# Patient Record
(demographics unavailable — no encounter records)

---

## 2024-11-13 NOTE — PHYSICAL EXAM
[de-identified] : Gen: NAD Resp: Nonlabored respirations, no SOB Gait: nl   R Knee: Skin intact small effusion 0-120 AROM nontender tibial tubercle 5/5 IP Q 5/5 EHL TA GS SILT L3-S1 2+ dp pt wwp bcr  1A lachman and (-) pivot shift Grade 1 posterior drawer Stable to v/v at 0 and 30 degrees (-) Dial test at 30, 90 degrees  1+ patellar translation (-) pain with patellar compression/grind (-) J sign (-) apprehension  [de-identified] : I independently reviewed and interpreted the CT of knee 5/26 and XR 6/4.  I discussed with the patient the CT demonstrates nondisplaced tibial tubercle frx, remains nondisplaced on xr.  The following radiographs were ordered and read by me during this patient's visit. I reviewed each radiograph in detail with the patient and discussed the findings as highlighted below.   2 views of the R knee were obtained today that show nondisplaced tibial tubercle frx - healed. There are mild degenerative changes seen. There is no malalignment. No obvious osseous abnormality. Otherwise unremarkable.

## 2024-11-13 NOTE — HISTORY OF PRESENT ILLNESS
[de-identified] : 78yF pw knee pain after a fall 5/26.  She landed onto her shin and was seen in [place] [facility], dx w nondisplaced tibial tubercle frx. She was admitted and is TTWB in a rehab facility.  She has minimal pain but is anxious to leave the SNF. She has no n/p, denies other trauma.  Has been in KI.  No calf swelling/cp/sob.  7/10 interval - out of SNF, walking well, WBAT, 0/10 pain  9/4 - no assistive device, some baseline arthritic pain, very happy w function  11/13 - as her activity has increased, so has her soreness, especially w incline Completed PT Inquires about HA

## 2024-11-13 NOTE — DISCUSSION/SUMMARY
[de-identified] : 78yF pw nondisplaced tibial tubercle frx, able to SLR. Now healed The patient was extensively counseled on treatment options including but not limited to observation, rest/activity modification, bracing, anti-inflammatory medications, physical therapy, injections, and surgery.  The natural history of the disease was thoroughly explained.  We discussed that the majority of the time, this condition can be initially treated conservatively. Discussed baseline mild OA - no restrictions She would like to trial HA inj  The risks, benefits, and alternatives to an injection were reviewed with the patient.  Risks outlined include but are not limited to infection, sepsis, bleeding, scarring, temporary increase in pain, syncope, failure to resolve symptoms, symptom recurrence, allergic reaction and a flare.  The patient understood these risks and wished to proceed with an injection, providing verbal consent. Under sterile conditions using chlorhexidine and an ethyl chloride spray for topic analgesia, an injection of 3cc Euflexxa was placed in the L knee.  The patient tolerated the procedure well without complication.  The patient was advised to call if redness, pain or fever occur and to apply ice for 15 minutes every hour for the next day as tolerated.   I have personally obtained the history, reviewed the ROS as noted, and performed the physical examination today.  The patient and I discussed the assessment and options and developed the plan.  All questions were answered and the patient stated their understanding of the treatment plan and appreciation of the visit.   My cumulative time spent on this patient's visit included: Preparation for the visit, review of the medical records, review of pertinent diagnostic studies, examination and counseling of the patient on the above diagnosis, treatment plan and prognosis, orders of diagnostic tests, medications and/or appropriate procedures and documentation in the medical records of today's visit.   Dominic Aguiar MD

## 2024-11-20 NOTE — PHYSICAL EXAM
[de-identified] : Gen: NAD Resp: Nonlabored respirations, no SOB Gait: nl   R Knee: Skin intact small effusion 0-120 AROM nontender tibial tubercle 5/5 IP Q 5/5 EHL TA GS SILT L3-S1 2+ dp pt wwp bcr  1A lachman and (-) pivot shift Grade 1 posterior drawer Stable to v/v at 0 and 30 degrees (-) Dial test at 30, 90 degrees  1+ patellar translation (-) pain with patellar compression/grind (-) J sign (-) apprehension  [de-identified] : I independently reviewed and interpreted the CT of knee 5/26 and XR 6/4.  I discussed with the patient the CT demonstrates nondisplaced tibial tubercle frx, remains nondisplaced on xr.  The following radiographs were ordered and read by me during this patient's visit. I reviewed each radiograph in detail with the patient and discussed the findings as highlighted below.   2 views of the R knee were obtained today that show nondisplaced tibial tubercle frx - healed. There are mild degenerative changes seen. There is no malalignment. No obvious osseous abnormality. Otherwise unremarkable.

## 2024-11-20 NOTE — HISTORY OF PRESENT ILLNESS
[de-identified] : 78yF pw knee pain after a fall 5/26.  She landed onto her shin and was seen in [place] ER, dx w nondisplaced tibial tubercle frx. She was admitted and is TTWB in a rehab facility.  She has minimal pain but is anxious to leave the SNF. She has no n/p, denies other trauma.  Has been in KI.  No calf swelling/cp/sob.  7/10 interval - out of SNF, walking well, WBAT, 0/10 pain  9/4 - no assistive device, some baseline arthritic pain, very happy w function  11/13 - as her activity has increased, so has her soreness, especially w incline Completed PT Inquires about HA  11/20 - felt great relief from 1st inj, walking 1.5mi

## 2024-11-20 NOTE — DISCUSSION/SUMMARY
[de-identified] : 78yF pw nondisplaced tibial tubercle frx, able to SLR. Now healed The patient was extensively counseled on treatment options including but not limited to observation, rest/activity modification, bracing, anti-inflammatory medications, physical therapy, injections, and surgery.  The natural history of the disease was thoroughly explained.  We discussed that the majority of the time, this condition can be initially treated conservatively. Discussed baseline mild OA - no restrictions She would like to trial HA inj  The risks, benefits, and alternatives to an injection were reviewed with the patient.  Risks outlined include but are not limited to infection, sepsis, bleeding, scarring, temporary increase in pain, syncope, failure to resolve symptoms, symptom recurrence, allergic reaction and a flare.  The patient understood these risks and wished to proceed with an injection, providing verbal consent. Under sterile conditions using chlorhexidine and an ethyl chloride spray for topic analgesia, an injection of 3cc Euflexxa was placed in the L knee.  The patient tolerated the procedure well without complication.  The patient was advised to call if redness, pain or fever occur and to apply ice for 15 minutes every hour for the next day as tolerated.   I have personally obtained the history, reviewed the ROS as noted, and performed the physical examination today.  The patient and I discussed the assessment and options and developed the plan.  All questions were answered and the patient stated their understanding of the treatment plan and appreciation of the visit.   My cumulative time spent on this patient's visit included: Preparation for the visit, review of the medical records, review of pertinent diagnostic studies, examination and counseling of the patient on the above diagnosis, treatment plan and prognosis, orders of diagnostic tests, medications and/or appropriate procedures and documentation in the medical records of today's visit.   Dominic Aguiar MD

## 2024-12-05 NOTE — PHYSICAL EXAM
[de-identified] : Gen: NAD Resp: Nonlabored respirations, no SOB Gait: nl   R Knee: Skin intact small effusion 0-120 AROM nontender tibial tubercle 5/5 IP Q 5/5 EHL TA GS SILT L3-S1 2+ dp pt wwp bcr  1A lachman and (-) pivot shift Grade 1 posterior drawer Stable to v/v at 0 and 30 degrees (-) Dial test at 30, 90 degrees  1+ patellar translation (-) pain with patellar compression/grind (-) J sign (-) apprehension  [de-identified] : I independently reviewed and interpreted the CT of knee 5/26 and XR 6/4.  I discussed with the patient the CT demonstrates nondisplaced tibial tubercle frx, remains nondisplaced on xr.  The following radiographs were ordered and read by me during this patient's visit. I reviewed each radiograph in detail with the patient and discussed the findings as highlighted below.   2 views of the R knee were obtained today that show nondisplaced tibial tubercle frx - healed. There are mild degenerative changes seen. There is no malalignment. No obvious osseous abnormality. Otherwise unremarkable.

## 2024-12-05 NOTE — DISCUSSION/SUMMARY
[de-identified] : 78yF pw nondisplaced tibial tubercle frx, able to SLR. Now healed The patient was extensively counseled on treatment options including but not limited to observation, rest/activity modification, bracing, anti-inflammatory medications, physical therapy, injections, and surgery.  The natural history of the disease was thoroughly explained.  We discussed that the majority of the time, this condition can be initially treated conservatively. Discussed baseline mild OA - no restrictions She would like to trial HA inj  The risks, benefits, and alternatives to an injection were reviewed with the patient.  Risks outlined include but are not limited to infection, sepsis, bleeding, scarring, temporary increase in pain, syncope, failure to resolve symptoms, symptom recurrence, allergic reaction and a flare.  The patient understood these risks and wished to proceed with an injection, providing verbal consent. Under sterile conditions using chlorhexidine and an ethyl chloride spray for topic analgesia, an injection of 3cc Euflexxa was placed in the L knee.  The patient tolerated the procedure well without complication.  The patient was advised to call if redness, pain or fever occur and to apply ice for 15 minutes every hour for the next day as tolerated.   I have personally obtained the history, reviewed the ROS as noted, and performed the physical examination today.  The patient and I discussed the assessment and options and developed the plan.  All questions were answered and the patient stated their understanding of the treatment plan and appreciation of the visit.   My cumulative time spent on this patient's visit included: Preparation for the visit, review of the medical records, review of pertinent diagnostic studies, examination and counseling of the patient on the above diagnosis, treatment plan and prognosis, orders of diagnostic tests, medications and/or appropriate procedures and documentation in the medical records of today's visit.   Dominic Aguiar MD

## 2024-12-05 NOTE — HISTORY OF PRESENT ILLNESS
[de-identified] : 78yF pw knee pain after a fall 5/26.  She landed onto her shin and was seen in [place] ER, dx w nondisplaced tibial tubercle frx. She was admitted and is TTWB in a rehab facility.  She has minimal pain but is anxious to leave the SNF. She has no n/p, denies other trauma.  Has been in KI.  No calf swelling/cp/sob.  7/10 interval - out of SNF, walking well, WBAT, 0/10 pain  9/4 - no assistive device, some baseline arthritic pain, very happy w function  11/13 - as her activity has increased, so has her soreness, especially w incline Completed PT Inquires about HA  11/20 - felt great relief from 1st inj, walking 1.5mi  12/5 - here for 3rd inj

## 2025-02-08 NOTE — PHYSICAL EXAM
[Alert] : alert [Well Nourished] : well nourished [No Acute Distress] : no acute distress [Well Developed] : well developed [Normal Sclera/Conjunctiva] : normal sclera/conjunctiva [EOMI] : extra ocular movement intact [No Proptosis] : no proptosis [Normal Oropharynx] : the oropharynx was normal [Thyroid Not Enlarged] : the thyroid was not enlarged [No Thyroid Nodules] : no palpable thyroid nodules [No Respiratory Distress] : no respiratory distress [No Accessory Muscle Use] : no accessory muscle use [Clear to Auscultation] : lungs were clear to auscultation bilaterally [Normal S1, S2] : normal S1 and S2 [Normal Rate] : heart rate was normal [Regular Rhythm] : with a regular rhythm [Pedal Pulses Normal] : the pedal pulses are present [No Edema] : no peripheral edema [Normal Bowel Sounds] : normal bowel sounds [Not Tender] : non-tender [Not Distended] : not distended [Soft] : abdomen soft [Normal Anterior Cervical Nodes] : no anterior cervical lymphadenopathy [No Spinal Tenderness] : no spinal tenderness [Spine Straight] : spine straight [No Stigmata of Cushings Syndrome] : no stigmata of Cushings Syndrome [Normal Gait] : normal gait [Normal Strength/Tone] : muscle strength and tone were normal [No Rash] : no rash [Normal Reflexes] : deep tendon reflexes were 2+ and symmetric [No Tremors] : no tremors [Oriented x3] : oriented to person, place, and time [Acanthosis Nigricans] : no acanthosis nigricans

## 2025-02-08 NOTE — PHYSICAL EXAM
[Alert] : alert [Well Nourished] : well nourished [No Acute Distress] : no acute distress [Well Developed] : well developed [Normal Sclera/Conjunctiva] : normal sclera/conjunctiva [EOMI] : extra ocular movement intact [No Proptosis] : no proptosis [Normal Oropharynx] : the oropharynx was normal [Thyroid Not Enlarged] : the thyroid was not enlarged [No Thyroid Nodules] : no palpable thyroid nodules [No Accessory Muscle Use] : no accessory muscle use [No Respiratory Distress] : no respiratory distress [Clear to Auscultation] : lungs were clear to auscultation bilaterally [Normal S1, S2] : normal S1 and S2 [Normal Rate] : heart rate was normal [Regular Rhythm] : with a regular rhythm [Pedal Pulses Normal] : the pedal pulses are present [No Edema] : no peripheral edema [Normal Bowel Sounds] : normal bowel sounds [Not Tender] : non-tender [Not Distended] : not distended [Soft] : abdomen soft [Normal Anterior Cervical Nodes] : no anterior cervical lymphadenopathy [No Spinal Tenderness] : no spinal tenderness [Spine Straight] : spine straight [No Stigmata of Cushings Syndrome] : no stigmata of Cushings Syndrome [Normal Gait] : normal gait [Normal Strength/Tone] : muscle strength and tone were normal [No Rash] : no rash [Normal Reflexes] : deep tendon reflexes were 2+ and symmetric [No Tremors] : no tremors [Oriented x3] : oriented to person, place, and time [Acanthosis Nigricans] : no acanthosis nigricans

## 2025-02-08 NOTE — HISTORY OF PRESENT ILLNESS
[FreeTextEntry1] : Ms. SCHRADER is a 78-year-old female who returns today for endocrine reevaluation. Patient returns with regard to a history of osteoporosis. She is taking Prolia injections. Last Prolia injection was 08/13/24. Has tolerated Prolia well to date.  Does have hx of pelvic and other fractures. Her more recent Tibial Tubercle Fx did heal in 6 weeks  On Prolia for osteoporosis-due for Prolia today.  With PT-left knee started to hurt-saw Ortho-had 3 gel shots-has helped.  had idzziness -now btetrer  CT head did show hamangioma  -stable x one year  Additional hx includes that of of hyperlipidemia and hypertension, prediabetes, vitamin D deficiency - The patient's latest A1C returned at 5.4% in Sept 2024.  She is s/p tibia fracture on 5/27/24, no surgery was needed. She was in rehab at [facility].    Hx of wrist fx in 2023-had surgery, Surgeon felt osteoporosis related. Had plate put in.  Latest dexa scan from 11/08/2023 did show: T score spine + 2.9 stable T score femoral neck left -2.8 T score total hip -1.4 stable  Prior Dexa scan from 10/26/2021 did show: T score spine 3.4 (+7.3% compared to previous study) T score femoral neck left -2.1 T score total hip -0.9 (+3.3% compared to previous study) T score radius 1/3 -2.1 10 year risk of fracture risk Non hip FRAX 13% Hip FRAX 3.5%  Hx of plantar fascitis right foot and too LBP, going to physical therapy. Had discomfort left hip-Had PT ____________________________________________________________________________________________________  Is on Atorvastatin and Irbesartan 150 mg  Taking vitamin D3 1,000 iu daily with food, cranberry pills  She had bilateral cataract surgery- vision is much improved.

## 2025-02-19 NOTE — PHYSICAL EXAM
[Alert] : alert [Oriented x 3] : ~L oriented x 3 [Well Nourished] : well nourished [Conjunctiva Non-injected] : conjunctiva non-injected [No Visual Lymphadenopathy] : no visual  lymphadenopathy [No Clubbing] : no clubbing [No Edema] : no edema [No Bromhidrosis] : no bromhidrosis [No Chromhidrosis] : no chromhidrosis [Full Body Skin Exam Performed] : performed [FreeTextEntry3] : General: Alert and oriented, in NAD.  All of the following were examined and were within normal limits, except as noted:   Scalp: Face, including eyelids, nose, lips, ears, oropharynx: Neck: Chest/Back/Abdomen: Bilateral Arms/Hands: Bilateral Legs/Feet: Buttocks, Genitalia, Anus/perineum:  	 Hair, Nails, Oral Mucosa, Eyes:   - pink scaly papules on face and R ear, total 18 - lentigines - yellow papules on face - brown to pink-brown macules and papules on face, torso, including R tragus - white yellow cystic papules on bl labia majora  - stuckon waxy brown papules including R postauricular crease - toenails with polish; though all onychauxis and subungual debris; scaly red patches in mocassin distribution on bl feet

## 2025-02-19 NOTE — ASSESSMENT
[FreeTextEntry1] : #Actinic Keratoses x18, face/R ear - Patient was verbally consented and the lesions identified above were treated with liquid nitrogen freeze, thaw, freeze x 10 seconds each cycle x 2. Side effects include blister formation, hypopigmentation, and scarring. The patient tolerated the procedure well.  Wound care instructions, care of a blister with vaseline, signs and symptoms of infection were discussed in full.  The patient denies any questions at this time.  #History of non-melanoma skin cancer - No evidence of recurrence - Sun protective measures reinforced - Recommend full body skin exam atleast annually  #Sebaceous hyperplasia, face - Benign condition discussed with patient - No further treatment indicated at this time  #Seborrheic Keratosis - These growths are benign - Related to genetics - these lesions run in families; NOT related to sun exposure - No treatment warranted unless inflamed; can use OTC Sarna lotion PRN itch  #Vulvar steatocystoma - Benign condition discussed with patient - No further treatment indicated at this time  #Multiple melanocytic nevi, benign  - Monitor moles for changes - Recommend wearing hats and sun protective clothing or OTC sunscreen products (SPF 30+, broad band, EltaMD/La Roche Posay/Neutrogena) daily on your face and entire body (apply sunscreen atleast 30 minutes prior to going outside). Reapply sunscreen every 2 hours when outside.  #Screening exam for skin cancer - no suspicious lesions on exam today - TBSE performed today - Advised sun protection.  Recommended OTC sunscreen products (EltaMD/Neutrogena/La Roche Posay), including SPF30+ with broadband UV protection as well as proper use.  Discussed OTC sun protective clothing - Counseled patient to monitor for changes, including mole monitoring and self-skin exams  #Onychomycosis of toenails - chronic; exacerbation. Pt didn't like ciclopirox #Tinea pedis, bl feet - chronic; exacerbation - Diagnosis and treatment options discussed. Pt defers PO - Refilled ketoconazole cream BID to AA   #Intertrigo - chronic; stable - c/w ketoconazole cream BID PRN skin folds